# Patient Record
Sex: FEMALE | Race: WHITE | NOT HISPANIC OR LATINO | Employment: FULL TIME | URBAN - METROPOLITAN AREA
[De-identification: names, ages, dates, MRNs, and addresses within clinical notes are randomized per-mention and may not be internally consistent; named-entity substitution may affect disease eponyms.]

---

## 2019-12-11 ENCOUNTER — OFFICE VISIT (OUTPATIENT)
Dept: FAMILY MEDICINE | Facility: CLINIC | Age: 39
End: 2019-12-11

## 2019-12-11 VITALS
OXYGEN SATURATION: 100 % | HEART RATE: 65 BPM | HEIGHT: 66 IN | TEMPERATURE: 97.2 F | RESPIRATION RATE: 16 BRPM | BODY MASS INDEX: 30.37 KG/M2 | WEIGHT: 189 LBS | SYSTOLIC BLOOD PRESSURE: 98 MMHG | DIASTOLIC BLOOD PRESSURE: 60 MMHG

## 2019-12-11 DIAGNOSIS — F33.0 MAJOR DEPRESSIVE DISORDER, RECURRENT EPISODE, MILD (H): Primary | ICD-10-CM

## 2019-12-11 PROCEDURE — 99203 OFFICE O/P NEW LOW 30 MIN: CPT | Performed by: FAMILY MEDICINE

## 2019-12-11 RX ORDER — SERTRALINE HYDROCHLORIDE 100 MG/1
100 TABLET, FILM COATED ORAL DAILY
Qty: 90 TABLET | Refills: 3 | Status: SHIPPED | OUTPATIENT
Start: 2019-12-11 | End: 2021-01-17

## 2019-12-11 ASSESSMENT — ANXIETY QUESTIONNAIRES
6. BECOMING EASILY ANNOYED OR IRRITABLE: NOT AT ALL
2. NOT BEING ABLE TO STOP OR CONTROL WORRYING: NOT AT ALL
7. FEELING AFRAID AS IF SOMETHING AWFUL MIGHT HAPPEN: NOT AT ALL
3. WORRYING TOO MUCH ABOUT DIFFERENT THINGS: NOT AT ALL
1. FEELING NERVOUS, ANXIOUS, OR ON EDGE: NOT AT ALL
5. BEING SO RESTLESS THAT IT IS HARD TO SIT STILL: NOT AT ALL
IF YOU CHECKED OFF ANY PROBLEMS ON THIS QUESTIONNAIRE, HOW DIFFICULT HAVE THESE PROBLEMS MADE IT FOR YOU TO DO YOUR WORK, TAKE CARE OF THINGS AT HOME, OR GET ALONG WITH OTHER PEOPLE: NOT DIFFICULT AT ALL
GAD7 TOTAL SCORE: 0

## 2019-12-11 ASSESSMENT — MIFFLIN-ST. JEOR: SCORE: 1531.11

## 2019-12-11 ASSESSMENT — PATIENT HEALTH QUESTIONNAIRE - PHQ9
5. POOR APPETITE OR OVEREATING: NOT AT ALL
SUM OF ALL RESPONSES TO PHQ QUESTIONS 1-9: 4

## 2019-12-11 NOTE — PROGRESS NOTES
Subjective     Maru Posadas is a 39 year old female who presents to clinic today for the following health issues:    HPI   Depression Followup    How are you doing with your depression since your last visit? No change    Are you having other symptoms that might be associated with depression? No    Have you had a significant life event?  OTHER: job change      Are you feeling anxious or having panic attacks?   No    Do you have any concerns with your use of alcohol or other drugs? No    Social History     Tobacco Use     Smoking status: Never Smoker     Smokeless tobacco: Never Used   Substance Use Topics     Alcohol use: Yes     Alcohol/week: 0.0 standard drinks     Comment: occasionally     Drug use: No     PHQ 11/20/2015 4/4/2016 12/12/2016   PHQ-9 Total Score 3 8 1   Q9: Thoughts of better off dead/self-harm past 2 weeks Not at all Not at all Not at all     ALEIDA-7 SCORE 10/7/2015 11/20/2015 4/4/2016   Total Score - - -   Total Score 0 0 2     Last PHQ-9 12/12/2016   1.  Little interest or pleasure in doing things 1   2.  Feeling down, depressed, or hopeless 0   3.  Trouble falling or staying asleep, or sleeping too much 0   4.  Feeling tired or having little energy 0   5.  Poor appetite or overeating 0   6.  Feeling bad about yourself 0   7.  Trouble concentrating 0   8.  Moving slowly or restless 0   Q9: Thoughts of better off dead/self-harm past 2 weeks 0   PHQ-9 Total Score 1   Difficulty at work, home, or with people Not difficult at all     ALEIDA-7  4/4/2016   1. Feeling nervous, anxious, or on edge 0   2. Not being able to stop or control worrying 0   3. Worrying too much about different things 1   4. Trouble relaxing 0   5. Being so restless that it is hard to sit still 0   6. Becoming easily annoyed or irritable 1   7. Feeling afraid, as if something awful might happen 0   ALEIDA-7 Total Score 2   If you checked any problems, how difficult have they made it for you to do your work, take care of things at  home, or get along with other people? Somewhat difficult     Advised the patient to go to the emergency room for assessment and immediate support    Suicide Assessment Five-step Evaluation and Treatment (SAFE-T)      How many servings of fruits and vegetables do you eat daily?  1-2    On average, how many sweetened beverages do you drink each day (Examples: soda, juice, sweet tea, etc.  Do NOT count diet or artificially sweetened beverages)?   0    How many days per week do you miss taking your medication? 0      Patient Active Problem List   Diagnosis     Allergic rhinitis     Closed fracture of navicular bone of foot     Pain in limb     Contusion of foot     CARDIOVASCULAR SCREENING; LDL GOAL LESS THAN 160     Sprain of hand, left     Mild major depression     Family history of early CAD     Obesity     Family history of blood clots     Encounter for insertion of mirena IUD     LORENZA (obstructive sleep apnea)     IUD (intrauterine device) in place     Elevated liver enzymes     CRP elevated     Past Surgical History:   Procedure Laterality Date     C CYSTOSCOPY,DIL BLADDER,LOCAL ANESTH  1983    bladder stretching as a child     C NONSPECIFIC PROCEDURE  1994    oral surgery       Social History     Tobacco Use     Smoking status: Never Smoker     Smokeless tobacco: Never Used   Substance Use Topics     Alcohol use: Yes     Alcohol/week: 0.0 standard drinks     Comment: occasionally     Family History   Problem Relation Age of Onset     Circulatory Mother         b:1954 hx vein stripping     C.A.D. Father         b:1953 hx MI x1, 1st age 43     Allergies Father      Respiratory Father         initial dx of asthma disputed by new dr.  Decreased lung capacity     Psychotic Disorder Brother         b:1981  autism, ADHD     Circulatory Brother         immune deficiency     Psychotic Disorder Brother         b:1992 autism, ADHD, mild CP     Attention Deficit Disorder Son          Current Outpatient Medications    Medication Sig Dispense Refill     Ascorbic Acid (VITAMIN C PO) Take 500 mg by mouth daily       Fexofenadine HCl (ALLEGRA PO)        levonorgestrel (MIRENA) 20 MCG/24HR IUD 1 each (20 mcg) by Intrauterine route once 1 each 0     Multiple Vitamin (MULTI-VITAMIN) per tablet Take 1 tablet by mouth daily.       sertraline (ZOLOFT) 100 MG tablet Take 1 tablet (100 mg) by mouth daily 90 tablet 3     VITAMIN D, CHOLECALCIFEROL, PO Take 2,000 Units by mouth daily       promethazine (PHENERGAN) 25 MG tablet Take 1 tablet (25 mg) by mouth every 8 hours as needed for nausea (Patient not taking: Reported on 12/11/2019) 20 tablet 1     SUMAtriptan (IMITREX) 50 MG tablet Take 1 tablet (50 mg) by mouth at onset of headache for migraine May repeat dose in 2 hours.  Do not exceed 200 mg in 24 hours (Patient not taking: Reported on 12/11/2019) 9 tablet 3     Allergies   Allergen Reactions     Seasonal Allergies      Sulfa Drugs      tongue swelling and pain     Recent Labs   Lab Test 06/09/16  1136 12/03/15  0923 10/07/15  0908 09/01/13  1515  10/22/12  1050 10/22/12  0856   A1C  --   --   --   --   --  5.5  --    LDL  --  107*  --   --   --   --  135*   HDL  --  61  --   --   --   --  51   TRIG  --  56  --   --   --   --  82   ALT 93* 35  --  32  --   --   --    CR 0.63 0.71  --  0.62   < >  --   --    GFRESTIMATED >90  Non  GFR Calc   >90  Non  GFR Calc    --  >90   < >  --   --    GFRESTBLACK >90   GFR Calc   >90   GFR Calc    --  >90   < >  --   --    POTASSIUM 3.3* 4.2  --  3.7   < >  --   --    TSH  --   --  1.90  --   --   --  2.67    < > = values in this interval not displayed.      BP Readings from Last 3 Encounters:   12/11/19 98/60   07/06/16 126/62   06/09/16 118/64    Wt Readings from Last 3 Encounters:   12/11/19 85.7 kg (189 lb)   07/06/16 98.8 kg (217 lb 12.8 oz)   06/09/16 97.5 kg (215 lb)           Reviewed and updated as needed this visit by  "Provider         Review of Systems   ROS COMP: Constitutional, HEENT, cardiovascular, pulmonary, gi and gu systems are negative, except as otherwise noted.      Objective    BP 98/60 (Cuff Size: Adult Large)   Pulse 65   Temp 97.2  F (36.2  C) (Tympanic)   Resp 16   Ht 1.664 m (5' 5.5\")   Wt 85.7 kg (189 lb)   SpO2 100%   BMI 30.97 kg/m    Body mass index is 30.97 kg/m .  Physical Exam   GENERAL: healthy, alert and no distress  EYES: Eyes grossly normal to inspection, PERRL and conjunctivae and sclerae normal  HENT: ear canals and TM's normal, nose and mouth without ulcers or lesions  NECK: no adenopathy, no asymmetry, masses, or scars and thyroid normal to palpation  RESP: lungs clear to auscultation - no rales, rhonchi or wheezes  CV: regular rate and rhythm, normal S1 S2, no S3 or S4, no murmur, click or rub, no peripheral edema and peripheral pulses strong  ABDOMEN: soft, nontender, no hepatosplenomegaly, no masses and bowel sounds normal  MS: no gross musculoskeletal defects noted, no edema            Assessment & Plan       ICD-10-CM    1. Major depressive disorder, recurrent episode, mild (H) F33.0 sertraline (ZOLOFT) 100 MG tablet   has been stable with current dose of med at Sertraline 100mg, will have her to try SAD light during the winter as supplements   Will keep her on current dose of meds, and keep monitoring sx      BMI:   Estimated body mass index is 30.97 kg/m  as calculated from the following:    Height as of this encounter: 1.664 m (5' 5.5\").    Weight as of this encounter: 85.7 kg (189 lb).           FUTURE APPOINTMENTS:       - Follow-up visit in 1 year    No follow-ups on file.    Eddie Coreas MD  [facility] [place]        "

## 2019-12-12 ASSESSMENT — ANXIETY QUESTIONNAIRES: GAD7 TOTAL SCORE: 0

## 2020-11-04 ENCOUNTER — VIRTUAL VISIT (OUTPATIENT)
Dept: FAMILY MEDICINE | Facility: OTHER | Age: 40
End: 2020-11-04

## 2020-11-04 NOTE — PROGRESS NOTES
"Date: 2020 13:51:11  Clinician: Alice Carrizales  Clinician NPI: [id number]  Patient: Maru Posadas  Patient : 1980  Patient Address: [facility] [address] [place] [id number]  Patient Phone: [phone]  Visit Protocol: URI  Patient Summary:  Maru is a 40 year old ( : 1980 ) female who initiated a [facility] Visit for COVID-19 (Coronavirus) evaluation and screening. When asked the question \"Please sign me up to receive news, health information and promotions. \", Maru responded \"No\".    Maru states her symptoms started gradually 3-4 days ago.   Her symptoms consist of myalgia, malaise, a sore throat, a headache, nasal congestion, and nausea. She is experiencing mild difficulty breathing with activities but can speak normally in full sentences.   Symptom details     Nasal secretions: The color of her mucus is white and clear.    Sore throat: Maru reports having mild throat pain (1-3 on a 10 point pain scale), has exudate on her tonsils, and can swallow liquids. She is not sure if the lymph nodes in her neck are enlarged. A rash has not appeared on the skin since the sore throat started.     Headache: She states the headache is moderate (4-6 on a 10 point pain scale).      Maru denies having vomiting, rhinitis, facial pain or pressure, chills, teeth pain, ageusia, diarrhea, ear pain, wheezing, fever, cough, and anosmia. She also denies taking antibiotic medication in the past month, having recent facial or sinus surgery in the past 60 days, and double sickening (worsening symptoms after initial improvement).   Precipitating events  Within the past week, Maru has not been exposed to someone with strep throat. She has not recently been exposed to someone with influenza. Maru has not been in close contact with any high risk individuals.   Pertinent COVID-19 (Coronavirus) information  Maru does not work or volunteer as healthcare worker or a . In the past 14 days, Maru has not worked or " volunteered at a healthcare facility or group living setting.   In the past 14 days, she also has not lived in a congregate living setting.   Maru has had a close contact with a laboratory-confirmed COVID-19 patient within 14 days of symptom onset. She was exposed at her work. She does not know when she was exposed to the laboratory-confirmed COVID-19 patient.   Additional information about contact with COVID-19 (Coronavirus) patient as reported by the patient (free text): Coworker was diagnosed,  but was at work before the positive results    Since December 2019, Maru has not been tested for COVID-19 and has not had upper respiratory infection or influenza-like illness.   Pertinent medical history  Maru does not get yeast infections when she takes antibiotics.   Maru needs a return to work/school note.   Weight: 210 lbs   Maru does not smoke or use smokeless tobacco.   She denies pregnancy and denies breastfeeding. She does not menstruate.   Weight: 210 lbs    MEDICATIONS: Allegra Allergy oral, sertraline oral, ALLERGIES: Sulfa (Sulfonamide Antibiotics)  Clinician Response:  Dear Maru,         Your symptoms show that you may have coronavirus (COVID-19). This&nbsp;illness can cause fever, cough and trouble breathing. Many people get a mild case and get better on their own. Some people can get very sick.  What should I do?  We would like to test you for this virus.  1. Please call [phone] to schedule your visit. Explain that you were referred by [facility] to have a COVID-19 test. Be ready to share your [facility] visit ID number. Do not schedule your appointment until you have had at least 2 days of symptoms or you may receive a false negative result.  The following will serve as your written order for this COVID Test, ordered by me, for the indication of suspected COVID [Z20.828]: The test will be ordered in [facility], our electronic health record, after you are scheduled. It will show as ordered and authorized by  "Deion Houston MD.  Order: COVID-19 (Coronavirus) PCR for SYMPTOMATIC testing from [facility].    2. When it's time for your COVID test:  Stay at least 6 feet away from others. (If someone will drive you to your test, stay in the backseat, as far away from the  as you can.)  Cover your mouth and nose with a mask, tissue or washcloth.  Go straight to the testing site. Don't make any stops on the way there or back.    3.Starting now:&nbsp;Stay home and away from others (self-isolate) until:   You've had&nbsp;no&nbsp;fever---and no medicine that reduces fever---for one full day (24 hours).&nbsp;And...  Your other symptoms have gotten better. For example, your cough or breathing has improved.&nbsp;And...  At least&nbsp;10 days&nbsp;have passed since your symptoms started.    During this time, don't leave the house except for testing or medical care.   Stay in your own room, even for meals. Use your own bathroom if you can.  Stay away from others in your home. No hugging, kissing or shaking hands. No visitors.  Don't go to work, school or anywhere else.   Clean \"high touch\" surfaces often (doorknobs, counters, handles, etc.). Use a household cleaning spray or wipes. You'll find a full list of  on the EPA website:&nbsp;www.epa.gov/pesticide-registration/list-n-disinfectants-use-against-sars-cov-2.   Cover your mouth and nose with a mask, tissue or washcloth to avoid spreading germs.  Wash your hands and face often. Use soap and water.  Caregivers in these groups are at risk for severe illness due to COVID-19:  o People 65 years and older  o People who live in a nursing home or long-term care facility  o People with chronic disease (lung, heart, cancer, diabetes, kidney, liver, immunologic)  o People who have a weakened immune system, including those who:   Are in cancer treatment  Take medicine that weakens the immune system, such as corticosteroids  Had a bone marrow or organ transplant  Have an immune deficiency  " Have poorly controlled HIV or AIDS  Are obese (body mass index of 40 or higher)  Smoke regularly   o Caregivers should wear gloves while washing dishes, handling laundry and cleaning bedrooms and bathrooms.  o Use caution when washing and drying laundry: Don't shake dirty laundry, and use the warmest water setting that you can.  o For more tips, go to&nbsp;www.cdc.gov/coronavirus/2019-ncov/downloads/10Things.pdf.   How can I take care of myself?    Get lots of rest. Drink extra fluids&nbsp;(unless a doctor has told you not to).  Take Tylenol (acetaminophen) for fever or pain.&nbsp;If you have liver or kidney problems, ask your family doctor if it's okay to take Tylenol.   Adults can take either:   650 mg (two 325 mg pills) every 4 to 6 hours,&nbsp;or...  1,000 mg (two 500 mg pills) every 8 hours as needed.  Note:&nbsp;Don't take more than 3,000 mg in one day. Acetaminophen is found in many medicines (both prescribed and over-the-counter medicines). Read all labels to be sure you don't take too much.   For children, check the Tylenol bottle for the right dose. The dose is based on the child's age or weight.   If you have other health problems (like cancer, heart failure, an organ transplant or severe kidney disease):&nbsp;Call your specialty clinic if you don't feel better in the next 2 days.    Know when to call 911.&nbsp;Emergency warning signs include:   Trouble breathing or shortness of breath Pain or pressure in the chest that doesn't go away Feeling confused like you haven't felt before, or not being able to wake up Bluish-colored lips or face.  Where can I get more information?    [facility] -- About COVID-19:&nbsp;[email or web address]  CDC -- What to Do If You're Sick:&nbsp;www.cdc.gov/coronavirus/2019-ncov/about/steps-when-sick.html  CDC -- Ending Home Isolation:&nbsp;www.cdc.gov/coronavirus/2019-ncov/hcp/disposition-in-home-patients.html  CDC -- Caring for  Someone:&nbsp;www.cdc.gov/coronavirus/2019-ncov/if-you-are-sick/care-for-someone.html  [facility] -- Interim Guidance for Hospital Discharge to Home:&nbsp;www.health.[facility].mn.us/diseases/coronavirus/hcp/hospdischarge.pdf  [facility] clinical trials (COVID-19 research studies):&nbsp;clinicalaffairs.[facility]-clinical-trials  Below are the COVID-19 hotlines at the [place] Department of Health ([facility]). Interpreters are available.   For health questions: Call 876-143-9637 or 1-201.763.2955 (7 a.m. to 7 p.m.) For questions about schools and childcare: Call 028-124-5992 or 1-692.765.6550 (7 a.m. to 7 p.m.)           Diagnosis: Contact with and (suspected) exposure to other viral communicable diseases  Diagnosis ICD: Z20.828

## 2020-11-07 DIAGNOSIS — Z20.822 SUSPECTED COVID-19 VIRUS INFECTION: Primary | ICD-10-CM

## 2020-11-08 DIAGNOSIS — Z20.822 SUSPECTED COVID-19 VIRUS INFECTION: ICD-10-CM

## 2020-11-08 PROCEDURE — U0003 INFECTIOUS AGENT DETECTION BY NUCLEIC ACID (DNA OR RNA); SEVERE ACUTE RESPIRATORY SYNDROME CORONAVIRUS 2 (SARS-COV-2) (CORONAVIRUS DISEASE [COVID-19]), AMPLIFIED PROBE TECHNIQUE, MAKING USE OF HIGH THROUGHPUT TECHNOLOGIES AS DESCRIBED BY CMS-2020-01-R: HCPCS | Performed by: NURSE PRACTITIONER

## 2020-11-09 LAB
SARS-COV-2 RNA SPEC QL NAA+PROBE: NOT DETECTED
SPECIMEN SOURCE: NORMAL

## 2021-01-13 DIAGNOSIS — F33.0 MAJOR DEPRESSIVE DISORDER, RECURRENT EPISODE, MILD (H): ICD-10-CM

## 2021-01-15 ENCOUNTER — HEALTH MAINTENANCE LETTER (OUTPATIENT)
Age: 41
End: 2021-01-15

## 2021-01-16 NOTE — TELEPHONE ENCOUNTER
Failed protocol.  please route to  team if patient needs an appointment     Jennifer MURRAYRN BSN  [facility]  [phone]    PHQ 12/12/2016 12/11/2019 1/15/2021   PHQ-9 Total Score 1 4 5   Q9: Thoughts of better off dead/self-harm past 2 weeks Not at all Not at all Not at all

## 2021-01-17 RX ORDER — SERTRALINE HYDROCHLORIDE 100 MG/1
TABLET, FILM COATED ORAL
Qty: 90 TABLET | Refills: 3 | Status: SHIPPED | OUTPATIENT
Start: 2021-01-17 | End: 2022-01-20

## 2021-09-04 ENCOUNTER — HEALTH MAINTENANCE LETTER (OUTPATIENT)
Age: 41
End: 2021-09-04

## 2021-12-11 ENCOUNTER — E-VISIT (OUTPATIENT)
Dept: URGENT CARE | Facility: CLINIC | Age: 41
End: 2021-12-11

## 2021-12-11 DIAGNOSIS — Z20.822 SUSPECTED COVID-19 VIRUS INFECTION: Primary | ICD-10-CM

## 2021-12-11 PROCEDURE — 99421 OL DIG E/M SVC 5-10 MIN: CPT | Performed by: PHYSICIAN ASSISTANT

## 2021-12-12 NOTE — PATIENT INSTRUCTIONS
Dear Maru Posadas,    Your symptoms show that you may have coronavirus (COVID-19) or a sinus infection. COVID can cause fever, cough and trouble breathing. Many people get a mild case and get better on their own. Some people can get very sick.     The vast majority of sinus infections are caused by viruses and improve after 12-14 days. This means antibiotics will not help your symptoms and possible give you undesirable side effects like diarrhea and upset stomach.    First, improve your sinus hygiene by:  Flonase/fluticasone nasal spray 2 sprays in each nostril once a day for 1 - 4 weeks.  This may take several days to become effective.   Consider saline nasal rinses  Ibuprofen 400 mg to 600 mg 4 times a day as needed for pain relief and anti-inflammatory  Psuedofed can help if you tolerate it but do not take if you have high blood pressure  These can be bought Over the Counter at any Pharmacy.    Will I be tested for COVID-19?  We would like to test you for Covid-19 virus. I have placed orders for this test.     To schedule: go to your [employer] home page and scroll down to the section that says  You have an appointment that needs to be scheduled  and click the large green button that says  Schedule Now  and follow the steps to find the next available openings.    If you are unable to complete these [employer] scheduling steps, please call [phone] to schedule your testing.     Return to work/school/ guidance:  Please let your workplace manager and staffing office know when your quarantine ends     We can t give you an exact date as it depends on the above. You can calculate this on your own or work with your manager/staffing office to calculate this. (For example if you were exposed on 10/4, you would have to quarantine for 14 full days. That would be through 10/18. You could return on 10/19.)      If you receive a positive COVID-19 test result, follow the guidance of the those who are giving you the  results. Usually the return to work is 10 (or in some cases 20 days from symptom onset.) If you work at [employer], you must also be cleared by Employee Occupational Health and Safety to return to work.        If you receive a negative COVID-19 test result and did not have a high risk exposure to someone with a known positive COVID-19 test, you can return to work once you're free of fever for 24 hours without fever-reducing medication and your symptoms are improving or resolved.      If you receive a negative COVID-19 test and If you had a high risk exposure to someone who has tested positive for COVID-19 then you can return to work 14 days after your last contact with the positive individual    Note: If you have ongoing exposure to the covid positive person, this quarantine period may be more than 14 days. (For example, if you are continued to be exposed to your child who tested positive and cannot isolate from them, then the quarantine of 7-14 days can't start until your child is no longer contagious. This is typically 10 days from onset of the child's symptoms. So the total duration may be 17-24 days in this case.)    Sign up for "[employer].   We know it's scary to hear that you might have COVID-19. We want to track your symptoms to make sure you're okay over the next 2 weeks. Please look for an email from "[employer]--this is a free, online program that we'll use to keep in touch. To sign up, follow the link in the email you will receive. Learn more at [email or web address]    How can I take care of myself?    Get lots of rest. Drink extra fluids (unless a doctor has told you not to)    Take Tylenol (acetaminophen) or ibuprofen for fever or pain. If you have liver or kidney problems, ask your family doctor if it's okay to take Tylenol o ibuprofen    If you have other health problems (like cancer, heart failure, an organ transplant or severe kidney disease): Call your specialty clinic if you  don't feel better in the next 2 days.    Know when to call 911. Emergency warning signs include:  o Trouble breathing or shortness of breath  o Pain or pressure in the chest that doesn't go away  o Feeling confused like you haven't felt before, or not being able to wake up  o Bluish-colored lips or face    Where can I get more information?  [facility] - About COVID-19:   [email or web address]    CDC - What to Do If You're Sick:   www.cdc.gov/coronavirus/2019-ncov/about/steps-when-sick.html    December 11, 2021  RE:  Maru Posadas                                                                                                                  [address] [place] [id number]      To whom it may concern:    I evaluated Maru Posadas on December 11, 2021. Maru Posadas should be excused from work/school.     They should let their workplace manager and staffing office know when their quarantine ends.    We can not give an exact date as it depends on the information below. They can calculate this on their own or work with their manager/staffing office to calculate this. (For example if they were exposed on 10/04, they would have to quarantine for 14 full days. That would be through 10/18. They could return on 10/19.)    Quarantine Guidelines:      If patient receives a positive COVID-19 test result, they should follow the guidance of those who are giving the results. Usually the return to work is 10 (or in some cases 20 days from symptom onset.) If they work at [facility] [employer], they must be cleared by Employee Occupational Health and Safety to return to work.        If patient receives a negative COVID-19 test result and did not have a high risk exposure to someone with a known positive COVID-19 test, they can return to work once they're free of fever for 24 hours without fever-reducing medication and their symptoms are improving or resolved.      If patient receives a negative COVID-19 test and if  they had a high risk exposure to someone who has tested positive for COVID-19 then they can return to work 14 days after their last contact with the positive individual    Note: If there is ongoing exposure to the covid positive person, this quarantine period may be longer than 14 days. (For example, if they are continually exposed to their child, who tested positive and cannot isolate from them, then the quarantine of 7-14 days can't start until their child is no longer contagious. This is typically 10 days from onset to the child's symptoms. So the total duration may be 17-24 days in this case.)     Sincerely,  Jose Luis Mccann PA-C

## 2022-01-19 DIAGNOSIS — F33.0 MAJOR DEPRESSIVE DISORDER, RECURRENT EPISODE, MILD (H): ICD-10-CM

## 2022-01-20 RX ORDER — SERTRALINE HYDROCHLORIDE 100 MG/1
TABLET, FILM COATED ORAL
Qty: 30 TABLET | Refills: 0 | Status: SHIPPED | OUTPATIENT
Start: 2022-01-20

## 2022-02-19 ENCOUNTER — HEALTH MAINTENANCE LETTER (OUTPATIENT)
Age: 42
End: 2022-02-19

## 2022-07-25 NOTE — PROGRESS NOTES
Here for IUD replacement. Thought IUD was due for removal. Through care everywhere chart review, placed 11/6/2017 at [facility]. Mirena IUD good for up to 7 years, FDA approved for 6. Shared this information with patient and she would like to keep current IUD in place and wait for replacement.     No charge visit.     JONNIE MullenM

## 2022-07-26 ENCOUNTER — OFFICE VISIT (OUTPATIENT)
Dept: MIDWIFE SERVICES | Facility: CLINIC | Age: 42
End: 2022-07-26

## 2022-07-26 VITALS — DIASTOLIC BLOOD PRESSURE: 70 MMHG | WEIGHT: 196 LBS | SYSTOLIC BLOOD PRESSURE: 114 MMHG | BODY MASS INDEX: 32.12 KG/M2

## 2022-07-26 DIAGNOSIS — Z01.812 PRE-PROCEDURE LAB EXAM: ICD-10-CM

## 2022-07-26 DIAGNOSIS — Z30.432 ENCOUNTER FOR REMOVAL OF INTRAUTERINE CONTRACEPTIVE DEVICE: Primary | ICD-10-CM

## 2022-07-26 LAB — HCG IFA URINE: NEGATIVE

## 2022-07-26 PROCEDURE — 84703 CHORIONIC GONADOTROPIN ASSAY: CPT | Performed by: REGISTERED NURSE

## 2022-07-26 NOTE — PATIENT INSTRUCTIONS
[facility] record:   Insert Intrauterine Device (IUD)- mirena   Date/Time: 11/6/2017 10:35 AM  Performed by: MENDOZA GAGNON  Authorized by: MENDOZA GAGNON  Due for replacement 11/6/2023    Mirena IUD works up to 7 years, FDA approved for 6 years

## 2022-10-16 ENCOUNTER — HEALTH MAINTENANCE LETTER (OUTPATIENT)
Age: 42
End: 2022-10-16

## 2023-04-01 ENCOUNTER — HEALTH MAINTENANCE LETTER (OUTPATIENT)
Age: 43
End: 2023-04-01

## 2023-07-25 NOTE — TELEPHONE ENCOUNTER
PHQ9 and GAD7 sent via [employer].   Iraida DEL CASTILLO RN  [facility]     
Routing refill request to provider for review/approval because:  PHQ 12/12/2016 12/11/2019 1/15/2021   PHQ-9 Total Score 1 4 5   Q9: Thoughts of better off dead/self-harm past 2 weeks Not at all Not at all Not at all     PHQ9 has also been sent via [employer].   Has not been seen over 6 months.   Iraida DEL CASTILLO RN  [facility]         
Never

## 2023-11-04 ENCOUNTER — HEALTH MAINTENANCE LETTER (OUTPATIENT)
Age: 43
End: 2023-11-04

## 2024-06-01 ENCOUNTER — HEALTH MAINTENANCE LETTER (OUTPATIENT)
Age: 44
End: 2024-06-01

## 2025-06-14 ENCOUNTER — HEALTH MAINTENANCE LETTER (OUTPATIENT)
Age: 45
End: 2025-06-14